# Patient Record
Sex: FEMALE | Race: BLACK OR AFRICAN AMERICAN | NOT HISPANIC OR LATINO | Employment: UNEMPLOYED | URBAN - METROPOLITAN AREA
[De-identification: names, ages, dates, MRNs, and addresses within clinical notes are randomized per-mention and may not be internally consistent; named-entity substitution may affect disease eponyms.]

---

## 2022-04-25 ENCOUNTER — HISTORICAL (OUTPATIENT)
Dept: ADMINISTRATIVE | Facility: HOSPITAL | Age: 67
End: 2022-04-25

## 2022-05-14 NOTE — OP NOTE
DATE OF SURGERY:        SURGEON:  Lonnie Georges MD    PREOPERATIVE DIAGNOSIS:  Chronic stage 4 full-thickness macular hole of the right eye.    POSTOPERATIVE DIAGNOSIS:  Chronic stage 4 full-thickness macular hole of the right eye.    PROCEDURES:  Pars plana vitrectomy with internal limiting membrane peeling, fluid-air exchange, and silicone oil infusion, all to the right eye.    ANESTHESIA:  General.    ESTIMATED BLOOD LOSS:  Less than 5 cc.    COMPLICATIONS:  None.    PROCEDURE INDICATIONS:  Ms. Velazquez has a history of a chronic full-thickness macular hole of the right eye and requires a vitrectomy with silicone oil.    PROCEDURE DESCRIPTION:  The patient was taken to the operative theater where general anesthesia was begun.  The right eye was prepped and draped in the normal sterile fashion and a lid speculum was applied.  A standard 3-port, 25-gauge pars plana vitrectomy was performed with all trocars being placed 3.5 mm from the surgical limbus.  A core vitrectomy was performed, including removal of the posterior hyaloid out to the peripheral retina.  ICG dye was used to stain the internal limiting membrane, which was peeled from the macular surface using ILM forceps without complication.  The peripheral retina was examined with scleral depression for retinal breaks and no retinal breaks were identified.  A fluid-air exchange was performed.  The superotemporal sclerotomy was widened to 20-gauge with an MVR blade and 5000 weight silicone oil was infused into the vitreous cavity.  The superotemporal sclerotomy was then closed with 7-0 Vicryl suture and its conjunctiva closed with 6-0 fast absorbing gut.  The other 2 sclerotomies were massaged closed with cotton-tip swabs.  The postoperative intraocular pressure was 15 mmHg.  Several drops of TobraDex ophthalmic solution were applied to the eye.  The lid speculum and eye drape were then removed and the eye was covered with a gauze patch and a Melendez shield.   The patient was then transported to the postoperative care unit to recover.    DISCHARGE CONDITION:  Good.      DISPOSITION:  Home with followup with Dr. Georges the following day.  This patient tolerated the procedure well.        ______________________________  Lonnie Georges MD    RIB/UD  DD:  04/25/2022  Time:  12:27PM  DT:  04/25/2022  Time:  12:36PM  Job #:  [id number]